# Patient Record
Sex: FEMALE | Race: WHITE | NOT HISPANIC OR LATINO | Employment: FULL TIME | URBAN - NONMETROPOLITAN AREA
[De-identification: names, ages, dates, MRNs, and addresses within clinical notes are randomized per-mention and may not be internally consistent; named-entity substitution may affect disease eponyms.]

---

## 2017-10-08 ENCOUNTER — HOSPITAL ENCOUNTER (EMERGENCY)
Facility: HOSPITAL | Age: 47
End: 2017-10-08

## 2019-06-27 ENCOUNTER — HOSPITAL ENCOUNTER (EMERGENCY)
Facility: HOSPITAL | Age: 49
Discharge: HOME | End: 2019-06-27
Attending: EMERGENCY MEDICINE | Admitting: EMERGENCY MEDICINE

## 2019-06-27 ENCOUNTER — APPOINTMENT (OUTPATIENT)
Dept: GENERAL RADIOLOGY | Facility: HOSPITAL | Age: 49
End: 2019-06-27

## 2019-06-27 ENCOUNTER — APPOINTMENT (OUTPATIENT)
Dept: CT IMAGING | Facility: HOSPITAL | Age: 49
End: 2019-06-27

## 2019-06-27 VITALS
SYSTOLIC BLOOD PRESSURE: 128 MMHG | OXYGEN SATURATION: 96 % | HEIGHT: 63 IN | HEART RATE: 60 BPM | TEMPERATURE: 97.8 F | BODY MASS INDEX: 40.75 KG/M2 | RESPIRATION RATE: 18 BRPM | DIASTOLIC BLOOD PRESSURE: 88 MMHG | WEIGHT: 230 LBS

## 2019-06-27 DIAGNOSIS — R51.9 NONINTRACTABLE HEADACHE, UNSPECIFIED CHRONICITY PATTERN, UNSPECIFIED HEADACHE TYPE: ICD-10-CM

## 2019-06-27 DIAGNOSIS — R55 SYNCOPE, NEAR: ICD-10-CM

## 2019-06-27 DIAGNOSIS — M79.18 MUSCULOSKELETAL PAIN: ICD-10-CM
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
## 2019-06-27 LAB
ALBUMIN SERPL-MCNC: 3.57 G/DL (ref 3.5–5.2)
ALBUMIN/GLOB SERPL: 1.1 G/DL
ALP SERPL-CCNC: 70 U/L (ref 39–117)
ALT SERPL W P-5'-P-CCNC: 20 U/L (ref 1–33)
ANION GAP SERPL CALCULATED.3IONS-SCNC: 13.2 MMOL/L (ref 5–15)
APTT PPP: 29.1 SECONDS (ref 23.8–36.1)
AST SERPL-CCNC: 27 U/L (ref 1–32)
BACTERIA UR QL AUTO: ABNORMAL /HPF
BASOPHILS # BLD AUTO: 0.01 10*3/MM3 (ref 0–0.2)
BASOPHILS NFR BLD AUTO: 0.1 % (ref 0–1.5)
BILIRUB SERPL-MCNC: 0.4 MG/DL (ref 0.2–1.2)
BILIRUB UR QL STRIP: ABNORMAL
BUN BLD-MCNC: 14 MG/DL (ref 6–20)
BUN/CREAT SERPL: 18.4 (ref 7–25)
CALCIUM SPEC-SCNC: 8.9 MG/DL (ref 8.6–10.5)
CHLORIDE SERPL-SCNC: 102 MMOL/L (ref 98–107)
CLARITY UR: ABNORMAL
CO2 SERPL-SCNC: 22.8 MMOL/L (ref 22–29)
COLOR UR: ABNORMAL
CREAT BLD-MCNC: 0.76 MG/DL (ref 0.57–1)
DEPRECATED RDW RBC AUTO: 46 FL (ref 37–54)
EOSINOPHIL # BLD AUTO: 0.06 10*3/MM3 (ref 0–0.4)
EOSINOPHIL NFR BLD AUTO: 0.7 % (ref 0.3–6.2)
ERYTHROCYTE [DISTWIDTH] IN BLOOD BY AUTOMATED COUNT: 13.3 % (ref 12.3–15.4)
GFR SERPL CREATININE-BSD FRML MDRD: 80 ML/MIN/1.73
GLOBULIN UR ELPH-MCNC: 3.1 GM/DL
GLUCOSE BLD-MCNC: 103 MG/DL (ref 65–99)
GLUCOSE BLDC GLUCOMTR-MCNC: 96 MG/DL (ref 70–130)
GLUCOSE UR STRIP-MCNC: NEGATIVE MG/DL
HCT VFR BLD AUTO: 43.1 % (ref 34–46.6)
HGB BLD-MCNC: 13.3 G/DL (ref 12–15.9)
HGB UR QL STRIP.AUTO: NEGATIVE
HOLD SPECIMEN: NORMAL
HOLD SPECIMEN: NORMAL
HYALINE CASTS UR QL AUTO: ABNORMAL /LPF
IMM GRANULOCYTES # BLD AUTO: 0.02 10*3/MM3 (ref 0–0.05)
IMM GRANULOCYTES NFR BLD AUTO: 0.2 % (ref 0–0.5)
INR PPP: 0.84 (ref 0.9–1.1)
KETONES UR QL STRIP: ABNORMAL
LEUKOCYTE ESTERASE UR QL STRIP.AUTO: ABNORMAL
LIPASE SERPL-CCNC: 34 U/L (ref 13–60)
LYMPHOCYTES # BLD AUTO: 2.62 10*3/MM3 (ref 0.7–3.1)
LYMPHOCYTES NFR BLD AUTO: 29.7 % (ref 19.6–45.3)
MCH RBC QN AUTO: 30 PG (ref 26.6–33)
MCHC RBC AUTO-ENTMCNC: 30.9 G/DL (ref 31.5–35.7)
MCV RBC AUTO: 97.3 FL (ref 79–97)
MONOCYTES # BLD AUTO: 0.7 10*3/MM3 (ref 0.1–0.9)
MONOCYTES NFR BLD AUTO: 7.9 % (ref 5–12)
NEUTROPHILS # BLD AUTO: 5.41 10*3/MM3 (ref 1.7–7)
NEUTROPHILS NFR BLD AUTO: 61.4 % (ref 42.7–76)
NITRITE UR QL STRIP: NEGATIVE
PH UR STRIP.AUTO: <=5 [PH] (ref 5–8)
PLATELET # BLD AUTO: 237 10*3/MM3 (ref 140–450)
PMV BLD AUTO: 9.8 FL (ref 6–12)
POTASSIUM BLD-SCNC: 4.1 MMOL/L (ref 3.5–5.2)
PROT SERPL-MCNC: 6.7 G/DL (ref 6–8.5)
PROT UR QL STRIP: NEGATIVE
PROTHROMBIN TIME: 12 SECONDS (ref 11–15.4)
RBC # BLD AUTO: 4.43 10*6/MM3 (ref 3.77–5.28)
RBC # UR: ABNORMAL /HPF
REF LAB TEST METHOD: ABNORMAL
SODIUM BLD-SCNC: 138 MMOL/L (ref 136–145)
SP GR UR STRIP: >=1.03 (ref 1–1.03)
SQUAMOUS #/AREA URNS HPF: ABNORMAL /HPF
TROPONIN T SERPL-MCNC: <0.01 NG/ML (ref 0–0.03)
TSH SERPL DL<=0.05 MIU/L-ACNC: 2.13 MIU/ML (ref 0.27–4.2)
UROBILINOGEN UR QL STRIP: ABNORMAL
WBC NRBC COR # BLD: 8.82 10*3/MM3 (ref 3.4–10.8)
WBC UR QL AUTO: ABNORMAL /HPF
WHOLE BLOOD HOLD SPECIMEN: NORMAL
WHOLE BLOOD HOLD SPECIMEN: NORMAL

## 2019-06-27 PROCEDURE — 85730 THROMBOPLASTIN TIME PARTIAL: CPT | Performed by: PHYSICIAN ASSISTANT

## 2019-06-27 PROCEDURE — 84443 ASSAY THYROID STIM HORMONE: CPT | Performed by: PHYSICIAN ASSISTANT

## 2019-06-27 PROCEDURE — 72131 CT LUMBAR SPINE W/O DYE: CPT | Performed by: RADIOLOGY

## 2019-06-27 PROCEDURE — 96375 TX/PRO/DX INJ NEW DRUG ADDON: CPT

## 2019-06-27 PROCEDURE — 84484 ASSAY OF TROPONIN QUANT: CPT | Performed by: PHYSICIAN ASSISTANT

## 2019-06-27 PROCEDURE — 71045 X-RAY EXAM CHEST 1 VIEW: CPT

## 2019-06-27 PROCEDURE — 93005 ELECTROCARDIOGRAM TRACING: CPT | Performed by: PHYSICIAN ASSISTANT

## 2019-06-27 PROCEDURE — 93010 ELECTROCARDIOGRAM REPORT: CPT | Performed by: INTERNAL MEDICINE

## 2019-06-27 PROCEDURE — 25010000002 ONDANSETRON PER 1 MG: Performed by: EMERGENCY MEDICINE

## 2019-06-27 PROCEDURE — 71045 X-RAY EXAM CHEST 1 VIEW: CPT | Performed by: RADIOLOGY

## 2019-06-27 PROCEDURE — 85025 COMPLETE CBC W/AUTO DIFF WBC: CPT | Performed by: PHYSICIAN ASSISTANT

## 2019-06-27 PROCEDURE — 25010000002 MORPHINE PER 10 MG: Performed by: EMERGENCY MEDICINE

## 2019-06-27 PROCEDURE — 81001 URINALYSIS AUTO W/SCOPE: CPT | Performed by: PHYSICIAN ASSISTANT

## 2019-06-27 PROCEDURE — 70450 CT HEAD/BRAIN W/O DYE: CPT

## 2019-06-27 PROCEDURE — 82962 GLUCOSE BLOOD TEST: CPT

## 2019-06-27 PROCEDURE — 83690 ASSAY OF LIPASE: CPT | Performed by: PHYSICIAN ASSISTANT

## 2019-06-27 PROCEDURE — 72125 CT NECK SPINE W/O DYE: CPT | Performed by: RADIOLOGY

## 2019-06-27 PROCEDURE — 80053 COMPREHEN METABOLIC PANEL: CPT | Performed by: PHYSICIAN ASSISTANT

## 2019-06-27 PROCEDURE — 72125 CT NECK SPINE W/O DYE: CPT

## 2019-06-27 PROCEDURE — 96374 THER/PROPH/DIAG INJ IV PUSH: CPT

## 2019-06-27 PROCEDURE — 99285 EMERGENCY DEPT VISIT HI MDM: CPT

## 2019-06-27 PROCEDURE — 25010000002 ORPHENADRINE CITRATE PER 60 MG: Performed by: PHYSICIAN ASSISTANT

## 2019-06-27 PROCEDURE — 72131 CT LUMBAR SPINE W/O DYE: CPT

## 2019-06-27 PROCEDURE — 70450 CT HEAD/BRAIN W/O DYE: CPT | Performed by: RADIOLOGY

## 2019-06-27 PROCEDURE — 72128 CT CHEST SPINE W/O DYE: CPT

## 2019-06-27 PROCEDURE — 85610 PROTHROMBIN TIME: CPT | Performed by: PHYSICIAN ASSISTANT

## 2019-06-27 PROCEDURE — 25010000002 KETOROLAC TROMETHAMINE PER 15 MG: Performed by: PHYSICIAN ASSISTANT

## 2019-06-27 PROCEDURE — 72128 CT CHEST SPINE W/O DYE: CPT | Performed by: RADIOLOGY

## 2019-06-27 RX ORDER — DEXAMETHASONE SODIUM PHOSPHATE 4 MG/ML
8 INJECTION, SOLUTION INTRA-ARTICULAR; INTRALESIONAL; INTRAMUSCULAR; INTRAVENOUS; SOFT TISSUE ONCE
Status: DISCONTINUED | OUTPATIENT
Start: 2019-06-27 | End: 2019-06-27

## 2019-06-27 RX ORDER — MORPHINE SULFATE 2 MG/ML
2 INJECTION, SOLUTION INTRAMUSCULAR; INTRAVENOUS ONCE
Status: COMPLETED | OUTPATIENT
Start: 2019-06-27 | End: 2019-06-27

## 2019-06-27 RX ORDER — KETOROLAC TROMETHAMINE 30 MG/ML
30 INJECTION, SOLUTION INTRAMUSCULAR; INTRAVENOUS ONCE
Status: COMPLETED | OUTPATIENT
Start: 2019-06-27 | End: 2019-06-27

## 2019-06-27 RX ORDER — ONDANSETRON 2 MG/ML
4 INJECTION INTRAMUSCULAR; INTRAVENOUS ONCE
Status: COMPLETED | OUTPATIENT
Start: 2019-06-27 | End: 2019-06-27

## 2019-06-27 RX ORDER — SODIUM CHLORIDE 0.9 % (FLUSH) 0.9 %
10 SYRINGE (ML) INJECTION AS NEEDED
Status: DISCONTINUED | OUTPATIENT
Start: 2019-06-27 | End: 2019-06-27 | Stop reason: HOSPADM

## 2019-06-27 RX ORDER — ORPHENADRINE CITRATE 100 MG/1
100 TABLET, EXTENDED RELEASE ORAL 2 TIMES DAILY PRN
Qty: 14 TABLET | Refills: 0 | Status: SHIPPED | OUTPATIENT
Start: 2019-06-27

## 2019-06-27 RX ORDER — ORPHENADRINE CITRATE 30 MG/ML
60 INJECTION INTRAMUSCULAR; INTRAVENOUS ONCE
Status: COMPLETED | OUTPATIENT
Start: 2019-06-27 | End: 2019-06-27

## 2019-06-27 RX ADMIN — ONDANSETRON 4 MG: 2 INJECTION, SOLUTION INTRAMUSCULAR; INTRAVENOUS at 14:49

## 2019-06-27 RX ADMIN — KETOROLAC TROMETHAMINE 30 MG: 30 INJECTION, SOLUTION INTRAMUSCULAR at 16:52

## 2019-06-27 RX ADMIN — ORPHENADRINE CITRATE 60 MG: 30 INJECTION INTRAMUSCULAR; INTRAVENOUS at 16:52

## 2019-06-27 RX ADMIN — SODIUM CHLORIDE 1000 ML: 9 INJECTION, SOLUTION INTRAVENOUS at 14:50

## 2019-06-27 RX ADMIN — MORPHINE SULFATE 2 MG: 2 INJECTION, SOLUTION INTRAMUSCULAR; INTRAVENOUS at 14:50

## 2024-10-03 ENCOUNTER — OFFICE VISIT (OUTPATIENT)
Dept: SURGERY | Facility: CLINIC | Age: 54
End: 2024-10-03
Payer: MEDICARE

## 2024-10-03 VITALS
WEIGHT: 279 LBS | BODY MASS INDEX: 49.43 KG/M2 | SYSTOLIC BLOOD PRESSURE: 122 MMHG | HEIGHT: 63 IN | DIASTOLIC BLOOD PRESSURE: 82 MMHG

## 2024-10-03 DIAGNOSIS — Z98.84 H/O GASTRIC BYPASS: ICD-10-CM

## 2024-10-03 DIAGNOSIS — E16.2 HYPOGLYCEMIA: Primary | ICD-10-CM

## 2024-10-03 PROCEDURE — 99203 OFFICE O/P NEW LOW 30 MIN: CPT | Performed by: SURGERY

## 2024-10-03 PROCEDURE — 1159F MED LIST DOCD IN RCRD: CPT | Performed by: SURGERY

## 2024-10-03 PROCEDURE — 1160F RVW MEDS BY RX/DR IN RCRD: CPT | Performed by: SURGERY

## 2024-10-03 RX ORDER — ONDANSETRON 4 MG/1
4 TABLET, ORALLY DISINTEGRATING ORAL EVERY 12 HOURS PRN
COMMUNITY
Start: 2024-09-24 | End: 2024-10-03

## 2024-10-03 RX ORDER — ACARBOSE 25 MG/1
50 TABLET ORAL 3 TIMES DAILY
COMMUNITY
Start: 2024-07-18 | End: 2024-10-03

## 2024-10-03 RX ORDER — LANCETS 30 GAUGE
EACH MISCELLANEOUS
COMMUNITY
Start: 2024-06-28 | End: 2024-10-03

## 2024-10-03 RX ORDER — SODIUM CHLORIDE 0.9 % (FLUSH) 0.9 %
10 SYRINGE (ML) INJECTION EVERY 12 HOURS SCHEDULED
Status: CANCELLED | OUTPATIENT
Start: 2024-10-07

## 2024-10-03 RX ORDER — SODIUM CHLORIDE 9 MG/ML
40 INJECTION, SOLUTION INTRAVENOUS AS NEEDED
Status: CANCELLED | OUTPATIENT
Start: 2024-10-07

## 2024-10-03 RX ORDER — POTASSIUM CHLORIDE 750 MG/1
10 TABLET, EXTENDED RELEASE ORAL DAILY
COMMUNITY
Start: 2024-07-02 | End: 2024-10-03

## 2024-10-03 RX ORDER — DICYCLOMINE HYDROCHLORIDE 10 MG/1
10 CAPSULE ORAL
COMMUNITY
Start: 2024-06-04 | End: 2024-10-03

## 2024-10-03 RX ORDER — ALBUTEROL SULFATE 0.83 MG/ML
2.5 SOLUTION RESPIRATORY (INHALATION) EVERY 6 HOURS PRN
COMMUNITY
Start: 2023-10-26 | End: 2024-10-03

## 2024-10-03 RX ORDER — HYDROCODONE BITARTRATE AND ACETAMINOPHEN 10; 325 MG/1; MG/1
1 TABLET ORAL EVERY 6 HOURS PRN
COMMUNITY
Start: 2024-09-24 | End: 2024-10-03

## 2024-10-03 RX ORDER — CELECOXIB 200 MG/1
200 CAPSULE ORAL DAILY
COMMUNITY
Start: 2024-06-04 | End: 2024-10-03

## 2024-10-03 RX ORDER — FAMOTIDINE 20 MG/1
1 TABLET, FILM COATED ORAL 2 TIMES DAILY
COMMUNITY
Start: 2024-06-04 | End: 2024-10-03

## 2024-10-03 RX ORDER — SODIUM CHLORIDE 0.9 % (FLUSH) 0.9 %
10 SYRINGE (ML) INJECTION AS NEEDED
Status: CANCELLED | OUTPATIENT
Start: 2024-10-07

## 2024-10-03 RX ORDER — PROCHLORPERAZINE 25 MG/1
SUPPOSITORY RECTAL
COMMUNITY
Start: 2024-07-09 | End: 2024-10-03

## 2024-10-03 RX ORDER — CLOTRIMAZOLE AND BETAMETHASONE DIPROPIONATE 10; .64 MG/G; MG/G
1 CREAM TOPICAL 2 TIMES DAILY
COMMUNITY
Start: 2024-07-30 | End: 2024-10-03

## 2024-10-03 RX ORDER — METOPROLOL TARTRATE 25 MG/1
25 TABLET, FILM COATED ORAL 2 TIMES DAILY
COMMUNITY
Start: 2024-06-04 | End: 2024-10-03

## 2024-10-03 NOTE — H&P (VIEW-ONLY)
"Date of Service: 10/3/2024    Subjective   Ricarda Chakraborty is a 54 y.o. female is being seen for consultation for hypoglycemia today at the request of Gita Hillman MD    Chief complaint: Hypoglycemia    Ricarda Chakraborty is a 54 y.o. class III obese female, prior history of gastric bypass for weight loss in 2012 at [facility], who presents my clinic with roughly 7 months to 1 year of episodic hypoglycemia that is postprandial.  At the time she was only eating 1-2 meals per day.  She has been seeing endocrinology in the instructed on more frequent meals and been given oral glucose tablets.  She reports diarrhea and nausea    She had a recent ER visit in [place] for abdominal distention    The patient takes her multivitamin and a twice daily PPI.  She denies NSAIDs.  She is a non-smoker    Past Medical History:   Diagnosis Date    Arthritis     Asthma     CHF (congestive heart failure)     Diabetes mellitus     Hypertension        Past Surgical History:   Procedure Laterality Date    CARPAL TUNNEL RELEASE Bilateral 1990    GASTRIC BYPASS N/A 2012   Cholecystectomy      Family History   Problem Relation Age of Onset    Heart disease Mother     Cancer Mother     Heart disease Father     Cancer Father          Social History     Socioeconomic History    Marital status:    Tobacco Use    Smokeless tobacco: Never   Vaping Use    Vaping status: Never Used   Substance and Sexual Activity    Drug use: Defer    Sexual activity: Defer                Review of Systems     14 pt ROS negative except for what is noted in HPI        Objective     Physical Exam:      10/03/24  0938   Weight: 127 kg (279 lb)    Body mass index is 49.44 kg/m².  Constitution: /82   Ht 160 cm (62.99\")   Wt 127 kg (279 lb)   BMI 49.44 kg/m²  . No acute distress.  Obese  Head: Normocephalic, atraumatic.   Eyes: Aligned without strabismus. Conjunctivae noninjected   Ears, Nose, Mouth: , no lesions appreciated   Respiratory: Symmetric " chest expansion. No respiratory distress.   Gastrointestinal:  Soft, nondistended, nontender  Skin:  No cyanosis, clubbing or edema bilaterally    Lymphatics: No abnormal cervical or supraclavicular adenopathy  Neurologic: No gross deficits.  Alert and oriented x3  Psychiatric: Normal mood              Ricarda Chakraborty is a 54 y.o. class III obese female, history of gastric bypass for weight loss, with postprandial hypoglycemia    We discussed that the patient's symptoms may be related to her dumping syndrome, for which increasing glucose load would make the problem worse.  However, I would like to further evaluate her surgical anatomy with an EGD as well as CT abdomen pelvis with IV and oral water-soluble contrast.  We discussed continuing her frequent meals and sipping on a protein shake between meals.  If workup is negative, it may be beneficial to refer the patient to a bariatric center to discuss with nutrition regarding dumping.        Class 3 Severe Obesity (BMI >=40). Obesity-related health conditions include the following: none. Obesity is unchanged. BMI is is above average; no BMI management plan is appropriate. We discussed portion control, increasing exercise, and Information on healthy weight added to patient's after visit summary.              This document has been electronically signed by Ian Colunga MD   October 3, 2024 11:45 EDT    [facility]

## 2024-10-03 NOTE — PROGRESS NOTES
"Date of Service: 10/3/2024    Subjective   Ricarda Chakraborty is a 54 y.o. female is being seen for consultation for hypoglycemia today at the request of Gita Hillman MD    Chief complaint: Hypoglycemia    Ricarda Chakraborty is a 54 y.o. class III obese female, prior history of gastric bypass for weight loss in 2012 at [facility], who presents my clinic with roughly 7 months to 1 year of episodic hypoglycemia that is postprandial.  At the time she was only eating 1-2 meals per day.  She has been seeing endocrinology in the instructed on more frequent meals and been given oral glucose tablets.  She reports diarrhea and nausea    She had a recent ER visit in [place] for abdominal distention    The patient takes her multivitamin and a twice daily PPI.  She denies NSAIDs.  She is a non-smoker    Past Medical History:   Diagnosis Date    Arthritis     Asthma     CHF (congestive heart failure)     Diabetes mellitus     Hypertension        Past Surgical History:   Procedure Laterality Date    CARPAL TUNNEL RELEASE Bilateral 1990    GASTRIC BYPASS N/A 2012   Cholecystectomy      Family History   Problem Relation Age of Onset    Heart disease Mother     Cancer Mother     Heart disease Father     Cancer Father          Social History     Socioeconomic History    Marital status:    Tobacco Use    Smokeless tobacco: Never   Vaping Use    Vaping status: Never Used   Substance and Sexual Activity    Drug use: Defer    Sexual activity: Defer                Review of Systems     14 pt ROS negative except for what is noted in HPI        Objective     Physical Exam:      10/03/24  0938   Weight: 127 kg (279 lb)    Body mass index is 49.44 kg/m².  Constitution: /82   Ht 160 cm (62.99\")   Wt 127 kg (279 lb)   BMI 49.44 kg/m²  . No acute distress.  Obese  Head: Normocephalic, atraumatic.   Eyes: Aligned without strabismus. Conjunctivae noninjected   Ears, Nose, Mouth: , no lesions appreciated   Respiratory: Symmetric " chest expansion. No respiratory distress.   Gastrointestinal:  Soft, nondistended, nontender  Skin:  No cyanosis, clubbing or edema bilaterally    Lymphatics: No abnormal cervical or supraclavicular adenopathy  Neurologic: No gross deficits.  Alert and oriented x3  Psychiatric: Normal mood              Ricarda Chakraborty is a 54 y.o. class III obese female, history of gastric bypass for weight loss, with postprandial hypoglycemia    We discussed that the patient's symptoms may be related to her dumping syndrome, for which increasing glucose load would make the problem worse.  However, I would like to further evaluate her surgical anatomy with an EGD as well as CT abdomen pelvis with IV and oral water-soluble contrast.  We discussed continuing her frequent meals and sipping on a protein shake between meals.  If workup is negative, it may be beneficial to refer the patient to a bariatric center to discuss with nutrition regarding dumping.        Class 3 Severe Obesity (BMI >=40). Obesity-related health conditions include the following: none. Obesity is unchanged. BMI is is above average; no BMI management plan is appropriate. We discussed portion control, increasing exercise, and Information on healthy weight added to patient's after visit summary.              This document has been electronically signed by Ian Colunga MD   October 3, 2024 11:45 EDT    [facility]

## 2024-10-04 ENCOUNTER — TELEPHONE (OUTPATIENT)
Dept: SURGERY | Facility: CLINIC | Age: 54
End: 2024-10-04
Payer: MEDICARE

## 2024-10-04 PROBLEM — E16.2 HYPOGLYCEMIA: Status: ACTIVE | Noted: 2024-10-03

## 2024-10-04 PROBLEM — Z98.84 H/O GASTRIC BYPASS: Status: ACTIVE | Noted: 2024-10-03

## 2024-10-04 RX ORDER — LANCETS 30 GAUGE
EACH MISCELLANEOUS
COMMUNITY
Start: 2024-10-04 | End: 2024-10-04

## 2024-10-04 RX ORDER — ONDANSETRON 4 MG/1
4 TABLET, ORALLY DISINTEGRATING ORAL EVERY 12 HOURS PRN
COMMUNITY
Start: 2024-10-04 | End: 2025-10-30

## 2024-10-04 RX ORDER — CELECOXIB 200 MG/1
200 CAPSULE ORAL DAILY
COMMUNITY
Start: 2024-10-04 | End: 2024-10-04

## 2024-10-04 RX ORDER — ACARBOSE 25 MG/1
50 TABLET ORAL 3 TIMES DAILY
COMMUNITY
Start: 2024-10-04 | End: 2024-10-04

## 2024-10-04 RX ORDER — DICYCLOMINE HYDROCHLORIDE 10 MG/1
10 CAPSULE ORAL
COMMUNITY
Start: 2024-10-04 | End: 2024-10-04

## 2024-10-04 RX ORDER — ORPHENADRINE CITRATE 100 MG/1
100 TABLET, EXTENDED RELEASE ORAL 2 TIMES DAILY PRN
Qty: 14 TABLET | Refills: 0 | COMMUNITY
Start: 2024-10-04 | End: 2024-10-04

## 2024-10-04 RX ORDER — ALBUTEROL SULFATE 0.83 MG/ML
2.5 SOLUTION RESPIRATORY (INHALATION) EVERY 6 HOURS PRN
COMMUNITY
Start: 2024-10-04 | End: 2024-10-04

## 2024-10-04 RX ORDER — FUROSEMIDE 20 MG
20 TABLET ORAL DAILY PRN
COMMUNITY

## 2024-10-04 RX ORDER — METOPROLOL TARTRATE 25 MG/1
25 TABLET, FILM COATED ORAL 2 TIMES DAILY
COMMUNITY
Start: 2024-10-04 | End: 2024-10-04

## 2024-10-04 RX ORDER — HYDROCODONE BITARTRATE AND ACETAMINOPHEN 10; 325 MG/1; MG/1
1 TABLET ORAL EVERY 6 HOURS PRN
COMMUNITY
Start: 2024-10-04 | End: 2024-10-04

## 2024-10-04 RX ORDER — FAMOTIDINE 20 MG/1
20 TABLET, FILM COATED ORAL 2 TIMES DAILY
COMMUNITY
Start: 2024-10-04 | End: 2024-10-04

## 2024-10-04 RX ORDER — PROCHLORPERAZINE 25 MG/1
SUPPOSITORY RECTAL
COMMUNITY
Start: 2024-10-04 | End: 2024-10-04

## 2024-10-04 RX ORDER — POTASSIUM CHLORIDE 750 MG/1
10 TABLET, EXTENDED RELEASE ORAL DAILY
COMMUNITY
Start: 2024-10-04 | End: 2025-10-21

## 2024-10-04 RX ORDER — CLOTRIMAZOLE AND BETAMETHASONE DIPROPIONATE 10; .64 MG/G; MG/G
1 CREAM TOPICAL 2 TIMES DAILY
COMMUNITY
Start: 2024-10-04 | End: 2024-10-04

## 2024-10-04 NOTE — TELEPHONE ENCOUNTER
You are scheduled for an EGD with Dr. Arriaza on Oct 7 2024 at 10:30. Arrive at outpatient surgery on the ground floor of the hospital, opposite end of the ER location. Do not eat/drink anything after midnight the night prior to procedure. You must have a  with you on day of surgery. If you have any questions please call the office at [phone].

## 2024-10-07 ENCOUNTER — ANESTHESIA EVENT (OUTPATIENT)
Dept: PERIOP | Facility: HOSPITAL | Age: 54
End: 2024-10-07
Payer: MEDICARE

## 2024-10-07 ENCOUNTER — HOSPITAL ENCOUNTER (OUTPATIENT)
Facility: HOSPITAL | Age: 54
Setting detail: HOSPITAL OUTPATIENT SURGERY
Discharge: HOME | End: 2024-10-07
Attending: SURGERY | Admitting: SURGERY
Payer: MEDICARE

## 2024-10-07 ENCOUNTER — ANESTHESIA (OUTPATIENT)
Dept: PERIOP | Facility: HOSPITAL | Age: 54
End: 2024-10-07
Payer: MEDICARE

## 2024-10-07 VITALS
DIASTOLIC BLOOD PRESSURE: 85 MMHG | BODY MASS INDEX: 46.6 KG/M2 | OXYGEN SATURATION: 94 % | HEART RATE: 69 BPM | HEIGHT: 63 IN | RESPIRATION RATE: 17 BRPM | TEMPERATURE: 98.6 F | SYSTOLIC BLOOD PRESSURE: 129 MMHG | WEIGHT: 263 LBS

## 2024-10-07 DIAGNOSIS — Z98.84 H/O GASTRIC BYPASS: ICD-10-CM

## 2024-10-07 DIAGNOSIS — E16.2 HYPOGLYCEMIA: ICD-10-CM

## 2024-10-07 LAB — GLUCOSE BLDC GLUCOMTR-MCNC: 96 MG/DL (ref 70–130)

## 2024-10-07 PROCEDURE — 25010000002 ONDANSETRON PER 1 MG: Performed by: NURSE ANESTHETIST, CERTIFIED REGISTERED

## 2024-10-07 PROCEDURE — 82948 REAGENT STRIP/BLOOD GLUCOSE: CPT

## 2024-10-07 PROCEDURE — 25010000002 PROPOFOL 200 MG/20ML EMULSION: Performed by: NURSE ANESTHETIST, CERTIFIED REGISTERED

## 2024-10-07 RX ORDER — METOPROLOL TARTRATE 25 MG/1
25 TABLET, FILM COATED ORAL 2 TIMES DAILY PRN
COMMUNITY

## 2024-10-07 RX ORDER — PROPOFOL 10 MG/ML
INJECTION, EMULSION INTRAVENOUS AS NEEDED
Status: DISCONTINUED | OUTPATIENT
Start: 2024-10-07 | End: 2024-10-07 | Stop reason: SURG

## 2024-10-07 RX ORDER — MIDAZOLAM HYDROCHLORIDE 1 MG/ML
1 INJECTION INTRAMUSCULAR; INTRAVENOUS
Status: DISCONTINUED | OUTPATIENT
Start: 2024-10-07 | End: 2024-10-07 | Stop reason: HOSPADM

## 2024-10-07 RX ORDER — MEPERIDINE HYDROCHLORIDE 25 MG/ML
12.5 INJECTION INTRAMUSCULAR; INTRAVENOUS; SUBCUTANEOUS
Status: DISCONTINUED | OUTPATIENT
Start: 2024-10-07 | End: 2024-10-07 | Stop reason: HOSPADM

## 2024-10-07 RX ORDER — ONDANSETRON 2 MG/ML
4 INJECTION INTRAMUSCULAR; INTRAVENOUS AS NEEDED
Status: DISCONTINUED | OUTPATIENT
Start: 2024-10-07 | End: 2024-10-07 | Stop reason: HOSPADM

## 2024-10-07 RX ORDER — IPRATROPIUM BROMIDE AND ALBUTEROL SULFATE 2.5; .5 MG/3ML; MG/3ML
3 SOLUTION RESPIRATORY (INHALATION) ONCE AS NEEDED
Status: DISCONTINUED | OUTPATIENT
Start: 2024-10-07 | End: 2024-10-07 | Stop reason: HOSPADM

## 2024-10-07 RX ORDER — FENTANYL CITRATE 50 UG/ML
50 INJECTION, SOLUTION INTRAMUSCULAR; INTRAVENOUS
Status: DISCONTINUED | OUTPATIENT
Start: 2024-10-07 | End: 2024-10-07 | Stop reason: HOSPADM

## 2024-10-07 RX ORDER — KETOROLAC TROMETHAMINE 30 MG/ML
30 INJECTION, SOLUTION INTRAMUSCULAR; INTRAVENOUS EVERY 6 HOURS PRN
Status: DISCONTINUED | OUTPATIENT
Start: 2024-10-07 | End: 2024-10-07 | Stop reason: HOSPADM

## 2024-10-07 RX ORDER — ACARBOSE 25 MG/1
50 TABLET ORAL
COMMUNITY
Start: 2024-10-04

## 2024-10-07 RX ORDER — SODIUM CHLORIDE 9 MG/ML
40 INJECTION, SOLUTION INTRAVENOUS AS NEEDED
Status: DISCONTINUED | OUTPATIENT
Start: 2024-10-07 | End: 2024-10-07 | Stop reason: HOSPADM

## 2024-10-07 RX ORDER — SODIUM CHLORIDE, SODIUM LACTATE, POTASSIUM CHLORIDE, CALCIUM CHLORIDE 600; 310; 30; 20 MG/100ML; MG/100ML; MG/100ML; MG/100ML
125 INJECTION, SOLUTION INTRAVENOUS ONCE
Status: DISCONTINUED | OUTPATIENT
Start: 2024-10-07 | End: 2024-10-07 | Stop reason: HOSPADM

## 2024-10-07 RX ORDER — HYDROCODONE BITARTRATE AND ACETAMINOPHEN 10; 325 MG/1; MG/1
1 TABLET ORAL EVERY 8 HOURS PRN
COMMUNITY

## 2024-10-07 RX ORDER — SODIUM CHLORIDE 0.9 % (FLUSH) 0.9 %
10 SYRINGE (ML) INJECTION AS NEEDED
Status: DISCONTINUED | OUTPATIENT
Start: 2024-10-07 | End: 2024-10-07 | Stop reason: HOSPADM

## 2024-10-07 RX ORDER — SODIUM CHLORIDE 0.9 % (FLUSH) 0.9 %
10 SYRINGE (ML) INJECTION EVERY 12 HOURS SCHEDULED
Status: DISCONTINUED | OUTPATIENT
Start: 2024-10-07 | End: 2024-10-07 | Stop reason: HOSPADM

## 2024-10-07 RX ORDER — OXYCODONE AND ACETAMINOPHEN 5; 325 MG/1; MG/1
1 TABLET ORAL ONCE AS NEEDED
Status: DISCONTINUED | OUTPATIENT
Start: 2024-10-07 | End: 2024-10-07 | Stop reason: HOSPADM

## 2024-10-07 RX ADMIN — PROPOFOL 50 MG: 10 INJECTION, EMULSION INTRAVENOUS at 08:23

## 2024-10-07 RX ADMIN — ONDANSETRON HYDROCHLORIDE 4 MG: 2 SOLUTION INTRAMUSCULAR; INTRAVENOUS at 08:36

## 2024-10-07 RX ADMIN — PROPOFOL 50 MG: 10 INJECTION, EMULSION INTRAVENOUS at 08:26

## 2024-10-07 RX ADMIN — PROPOFOL 50 MG: 10 INJECTION, EMULSION INTRAVENOUS at 08:25

## 2024-10-07 NOTE — ANESTHESIA POSTPROCEDURE EVALUATION
Patient: Ricarda Chakraborty    Procedure Summary       Date: 10/07/24 Room / Location:  [facility] 07 /  [facility]    Anesthesia Start: 0821 Anesthesia Stop: 0828    Procedure: ESOPHAGOGASTRODUODENOSCOPY with biopsy, possible dilation (Esophagus) Diagnosis:       Hypoglycemia      H/O gastric bypass      (Hypoglycemia [E16.2])      (H/O gastric bypass [Z98.84])    Surgeons: Ian Colunga MD Provider: Layton Hannah DO    Anesthesia Type: general ASA Status: 3            Anesthesia Type: general    Vitals  Vitals Value Taken Time   /85 10/07/24 0900   Temp 98.6 °F (37 °C) 10/07/24 0830   Pulse 69 10/07/24 0900   Resp 17 10/07/24 0900   SpO2 94 % 10/07/24 0900           Post Anesthesia Care and Evaluation    Patient location during evaluation: PHASE II  Patient participation: complete - patient participated  Level of consciousness: awake and alert  Pain score: 1  Pain management: adequate    Airway patency: patent  Anesthetic complications: No anesthetic complications  PONV Status: controlled  Cardiovascular status: acceptable  Respiratory status: acceptable  Hydration status: acceptable

## 2024-10-07 NOTE — ANESTHESIA PREPROCEDURE EVALUATION
Anesthesia Evaluation     Patient summary reviewed and Nursing notes reviewed   no history of anesthetic complications:                Airway   Mallampati: I  TM distance: >3 FB  Neck ROM: full  No difficulty expected  Dental - normal exam     Pulmonary - normal exam   (+) asthma,  Cardiovascular - normal exam  Exercise tolerance: good (4-7 METS)    NYHA Classification: II  Patient on routine beta blocker and Beta blocker given within 24 hours of surgery    (+) hypertension, CHF       Neuro/Psych- negative ROS  GI/Hepatic/Renal/Endo    (+) diabetes mellitus    Musculoskeletal     Abdominal  - normal exam    Bowel sounds: normal.   Substance History - negative use     OB/GYN negative ob/gyn ROS         Other   arthritis,                   Anesthesia Plan    ASA 3     general     intravenous induction     Anesthetic plan, risks, benefits, and alternatives have been provided, discussed and informed consent has been obtained with: patient.    CODE STATUS:

## 2024-10-08 LAB — REF LAB TEST METHOD: NORMAL

## 2024-10-14 RX ORDER — FAMOTIDINE 20 MG/1
20 TABLET, FILM COATED ORAL 2 TIMES DAILY
COMMUNITY

## 2024-10-24 ENCOUNTER — OFFICE VISIT (OUTPATIENT)
Dept: SURGERY | Facility: CLINIC | Age: 54
End: 2024-10-24
Payer: MEDICARE

## 2024-10-24 ENCOUNTER — HOSPITAL ENCOUNTER (OUTPATIENT)
Facility: HOSPITAL | Age: 54
Discharge: HOME | End: 2024-10-24
Admitting: SURGERY
Payer: MEDICARE

## 2024-10-24 VITALS
WEIGHT: 290 LBS | DIASTOLIC BLOOD PRESSURE: 68 MMHG | HEIGHT: 63 IN | BODY MASS INDEX: 51.38 KG/M2 | SYSTOLIC BLOOD PRESSURE: 120 MMHG

## 2024-10-24 DIAGNOSIS — Z98.84 H/O GASTRIC BYPASS: Primary | ICD-10-CM

## 2024-10-24 DIAGNOSIS — Z98.84 H/O GASTRIC BYPASS: ICD-10-CM

## 2024-10-24 DIAGNOSIS — E16.2 HYPOGLYCEMIA: ICD-10-CM

## 2024-10-24 PROCEDURE — 25510000001 IOPAMIDOL 61 % SOLUTION: Performed by: SURGERY

## 2024-10-24 PROCEDURE — 99213 OFFICE O/P EST LOW 20 MIN: CPT | Performed by: SURGERY

## 2024-10-24 PROCEDURE — 1160F RVW MEDS BY RX/DR IN RCRD: CPT | Performed by: SURGERY

## 2024-10-24 PROCEDURE — 74177 CT ABD & PELVIS W/CONTRAST: CPT

## 2024-10-24 PROCEDURE — 1159F MED LIST DOCD IN RCRD: CPT | Performed by: SURGERY

## 2024-10-24 RX ORDER — IOPAMIDOL 612 MG/ML
100 INJECTION, SOLUTION INTRAVASCULAR
Status: COMPLETED | OUTPATIENT
Start: 2024-10-24 | End: 2024-10-24

## 2024-10-24 RX ORDER — ACARBOSE 50 MG/1
50 TABLET ORAL
Qty: 90 TABLET | Refills: 1 | Status: SHIPPED | OUTPATIENT
Start: 2024-10-24

## 2024-10-24 RX ADMIN — IOPAMIDOL 100 ML: 612 INJECTION, SOLUTION INTRAVENOUS at 12:07

## 2024-10-24 NOTE — LETTER
October 27, 2024     Lex Westbrook MD  [address] [place] [address]    Patient: Ricarda Chakraborty   YOB: 1970   Date of Visit: 10/24/2024     Dear Lex Westbrook MD:     I am referring this patient to you for further workup of postprandial hypoglycemia in the setting of gastric bypass.    If you have questions, please do not hesitate to call me. I look forward to following Ricarda along with you.         Sincerely,        Ian Colunga MD        CC: No Recipients    Ian Colunga MD  10/27/24 2141  Sign when Signing Visit  Patient Name:  Ricarda Chakraborty  YOB: 1970  [id number]           General Surgery Office Follow Up    Date of Service: 10/27/24    Chief Complaint-endoscopy follow-up    Subjective    Ricarda Chakraborty is a 54 y.o. class III obese female, prior history of gastric bypass for weight loss in 2012 at [facility], who presents my clinic with roughly 7 months to 1 year of episodic hypoglycemia that is postprandial.  At the time she was only eating 1-2 meals per day.  She has been seeing endocrinology in the instructed on more frequent meals and been given acarbose tablets.  She reports diarrhea and nausea     She had a recent ER visit in [place] for abdominal distention     The patient takes her multivitamin and a twice daily PPI.  She denies NSAIDs.  She is a non-smoker    She is following up after recent EGD as well as CT abdomen pelvis with IV and oral water-soluble contrast.  EGD was relatively normal except for GJ anastomosis being slightly larger than expected.  H. pylori negative.  Her acarbose had recently been increased but she reports continued hypoglycemia.  I had obtained a CT abdomen pelvis with IV and oral water-soluble contrast which demonstrated a small amount of pneumoperitoneum within the epigastrium.  The patient is otherwise well and only reports very mild left sided periumbilical discomfort without evidence of peritonitis.    Allergy:   Allergies  "  Allergen Reactions   • Neurontin [Gabapentin] Rash   • Other Food Allergy Palpitations     Antibiotic that starts with an M           PMHx:   Past Medical History:   Diagnosis Date   • Arthritis    • Asthma    • Bronchitis    • CHF (congestive heart failure)    • Diabetes mellitus    • Hypertension          Past Surgical History:  Past Surgical History:   Procedure Laterality Date   • CARPAL TUNNEL RELEASE Bilateral 1990   • COLONOSCOPY     • ENDOSCOPY     • ENDOSCOPY N/A 10/7/2024    Procedure: ESOPHAGOGASTRODUODENOSCOPY with biopsy, possible dilation;  Surgeon: Ian Colunga MD;  Location: [facility];  Service: Gastroenterology;  Laterality: N/A;   • GASTRIC BYPASS N/A 2012   • HERNIA REPAIR     • TOTAL SHOULDER ARTHROPLASTY Left          Family History:   Family History   Problem Relation Age of Onset   • Heart disease Mother    • Cancer Mother    • Heart disease Father    • Cancer Father          Social History:  Social History     Socioeconomic History   • Marital status:    Tobacco Use   • Smoking status: Never     Passive exposure: Never   • Smokeless tobacco: Never   Vaping Use   • Vaping status: Never Used   Substance and Sexual Activity   • Alcohol use: Never   • Drug use: Defer   • Sexual activity: Defer            Review of Systems     14 pt ROS negative except for what is noted in HPI       Objective    Physical Exam:      Vital Signs  /68   Ht 160 cm (62.99\")   Wt 132 kg (290 lb)   BMI 51.38 kg/m²     Body mass index is 51.38 kg/m².    Physical Exam:      Constitution: /68   Ht 160 cm (62.99\")   Wt 132 kg (290 lb)   BMI 51.38 kg/m²  . No acute distress.  Obese  Head: Normocephalic, atraumatic.   Eyes: Aligned without strabismus. Conjunctivae noninjected   Ears, Nose, Mouth: . No lesions appreciated   Respiratory: Symmetric chest expansion. No respiratory distress.   Gastrointestinal:  soft, nondistended, no significant tenderness  Skin:  No cyanosis, clubbing or edema " bilaterally   Lymphatics: No abnormal cervical or supraclavicular lymphadenopathy appreciated   Neurologic: No gross deficits. Alert and oriented x3   Psychiatric: normal mood       Results Review: I have personally reviewed all of the recent lab and imaging results available at this time.     I have personally reviewed the images from the patient's CT abdomen pelvis with IV and oral water-soluble contrast.  Tiny spots of pneumoperitoneum within the epigastrium, along the liver edge and to the left of the patient's hernia mesh without any extravasation of oral contrast       Assessment & Plan    Assessment and Plan:  Ricarda Chakraborty is a 54 y.o. class III obese female, history of gastric bypass for weight loss, with postprandial hypoglycemia.  Incidental finding of tiny amounts of pneumoperitoneum on imaging.  Patient is hemodynamically stable without peritonitis    Acarbose increased.  I will refer the patient to a local bariatric surgeon who regularly performs gastric bypass.  With regards to the incidental finding of tiny amounts of pneumoperitoneum.  The patient's EGD was relatively unremarkable so marginal ulceration less likely.  The patient takes her PPI.  Unclear etiology on CT.  This possibly could be coming from the gastric remnant which is difficult to evaluate with her current anatomy.                      This document has been electronically signed by Ian Colunga MD   October 27, 2024 21:34 T    [facility]

## 2024-10-25 ENCOUNTER — TELEPHONE (OUTPATIENT)
Dept: SURGERY | Facility: CLINIC | Age: 54
End: 2024-10-25
Payer: MEDICARE

## 2024-10-25 NOTE — TELEPHONE ENCOUNTER
Spoke with patient and gave appt info :  referral made to Dr Lex Westbrook  Nov 5 2024 @  1:15 [facility]

## 2024-10-28 NOTE — PROGRESS NOTES
Patient Name:  Ricarda Chakraborty  YOB: 1970  [id number]           General Surgery Office Follow Up    Date of Service: 10/27/24    Chief Complaint-endoscopy follow-up    Subjective     Ricarda Chakraborty is a 54 y.o. class III obese female, prior history of gastric bypass for weight loss in 2012 at [facility], who presents my clinic with roughly 7 months to 1 year of episodic hypoglycemia that is postprandial.  At the time she was only eating 1-2 meals per day.  She has been seeing endocrinology in the instructed on more frequent meals and been given acarbose tablets.  She reports diarrhea and nausea     She had a recent ER visit in [place] for abdominal distention     The patient takes her multivitamin and a twice daily PPI.  She denies NSAIDs.  She is a non-smoker    She is following up after recent EGD as well as CT abdomen pelvis with IV and oral water-soluble contrast.  EGD was relatively normal except for GJ anastomosis being slightly larger than expected.  H. pylori negative.  Her acarbose had recently been increased but she reports continued hypoglycemia.  I had obtained a CT abdomen pelvis with IV and oral water-soluble contrast which demonstrated a small amount of pneumoperitoneum within the epigastrium.  The patient is otherwise well and only reports very mild left sided periumbilical discomfort without evidence of peritonitis.    Allergy:   Allergies   Allergen Reactions    Neurontin [Gabapentin] Rash    Other Food Allergy Palpitations     Antibiotic that starts with an M           PMHx:   Past Medical History:   Diagnosis Date    Arthritis     Asthma     Bronchitis     CHF (congestive heart failure)     Diabetes mellitus     Hypertension          Past Surgical History:  Past Surgical History:   Procedure Laterality Date    CARPAL TUNNEL RELEASE Bilateral 1990    COLONOSCOPY      ENDOSCOPY      ENDOSCOPY N/A 10/7/2024    Procedure: ESOPHAGOGASTRODUODENOSCOPY with biopsy, possible dilation;   "Surgeon: Ian Colunga MD;  Location: [facility];  Service: Gastroenterology;  Laterality: N/A;    GASTRIC BYPASS N/A 2012    HERNIA REPAIR      TOTAL SHOULDER ARTHROPLASTY Left          Family History:   Family History   Problem Relation Age of Onset    Heart disease Mother     Cancer Mother     Heart disease Father     Cancer Father          Social History:  Social History     Socioeconomic History    Marital status:    Tobacco Use    Smoking status: Never     Passive exposure: Never    Smokeless tobacco: Never   Vaping Use    Vaping status: Never Used   Substance and Sexual Activity    Alcohol use: Never    Drug use: Defer    Sexual activity: Defer            Review of Systems     14 pt ROS negative except for what is noted in HPI       Objective     Physical Exam:      Vital Signs  /68   Ht 160 cm (62.99\")   Wt 132 kg (290 lb)   BMI 51.38 kg/m²     Body mass index is 51.38 kg/m².    Physical Exam:      Constitution: /68   Ht 160 cm (62.99\")   Wt 132 kg (290 lb)   BMI 51.38 kg/m²  . No acute distress.  Obese  Head: Normocephalic, atraumatic.   Eyes: Aligned without strabismus. Conjunctivae noninjected   Ears, Nose, Mouth: . No lesions appreciated   Respiratory: Symmetric chest expansion. No respiratory distress.   Gastrointestinal:  soft, nondistended, no significant tenderness  Skin:  No cyanosis, clubbing or edema bilaterally   Lymphatics: No abnormal cervical or supraclavicular lymphadenopathy appreciated   Neurologic: No gross deficits. Alert and oriented x3   Psychiatric: normal mood       Results Review: I have personally reviewed all of the recent lab and imaging results available at this time.     I have personally reviewed the images from the patient's CT abdomen pelvis with IV and oral water-soluble contrast.  Tiny spots of pneumoperitoneum within the epigastrium, along the liver edge and to the left of the patient's hernia mesh without any extravasation of oral contrast     "   Assessment & Plan     Assessment and Plan:  Ricarda Chakraborty is a 54 y.o. class III obese female, history of gastric bypass for weight loss, with postprandial hypoglycemia.  Incidental finding of tiny amounts of pneumoperitoneum on imaging.  Patient is hemodynamically stable without peritonitis    Acarbose increased.  I will refer the patient to a local bariatric surgeon who regularly performs gastric bypass.  With regards to the incidental finding of tiny amounts of pneumoperitoneum.  The patient's EGD was relatively unremarkable so marginal ulceration less likely.  The patient takes her PPI.  Unclear etiology on CT.  This possibly could be coming from the gastric remnant which is difficult to evaluate with her current anatomy.                      This document has been electronically signed by Ian Colunga MD   October 27, 2024 21:34 EDT    [facility]

## (undated) DEVICE — FRCP BX RADJAW4 NDL 2.8 240CM LG OG BX40

## (undated) DEVICE — Device

## (undated) DEVICE — Device: Brand: DEFENDO AIR/WATER/SUCTION AND BIOPSY VALVE

## (undated) DEVICE — THE BITE BLOCK MAXI, LATEX FREE STRAP IS USED TO PROTECT THE ENDOSCOPE INSERTION TUBE FROM BEING BITTEN BY THE PATIENT.